# Patient Record
Sex: FEMALE | Race: OTHER | HISPANIC OR LATINO | Employment: UNEMPLOYED | URBAN - METROPOLITAN AREA
[De-identification: names, ages, dates, MRNs, and addresses within clinical notes are randomized per-mention and may not be internally consistent; named-entity substitution may affect disease eponyms.]

---

## 2017-09-21 ENCOUNTER — HOSPITAL ENCOUNTER (EMERGENCY)
Facility: HOSPITAL | Age: 22
Discharge: HOME | End: 2017-09-21
Payer: MEDICAID

## 2017-09-21 VITALS
DIASTOLIC BLOOD PRESSURE: 89 MMHG | HEART RATE: 90 BPM | SYSTOLIC BLOOD PRESSURE: 137 MMHG | TEMPERATURE: 98 F | RESPIRATION RATE: 16 BRPM | WEIGHT: 147 LBS | OXYGEN SATURATION: 100 %

## 2017-09-21 DIAGNOSIS — Z32.02 NEGATIVE PREGNANCY TEST: ICD-10-CM

## 2017-09-21 DIAGNOSIS — R11.2 NAUSEA AND VOMITING IN ADULT: ICD-10-CM

## 2017-09-21 DIAGNOSIS — N39.0 URINARY TRACT INFECTION WITH HEMATURIA, SITE UNSPECIFIED: Primary | ICD-10-CM

## 2017-09-21 DIAGNOSIS — R31.9 URINARY TRACT INFECTION WITH HEMATURIA, SITE UNSPECIFIED: Primary | ICD-10-CM

## 2017-09-21 LAB
B-HCG UR QL: NEGATIVE
BACTERIA #/AREA URNS HPF: ABNORMAL /HPF
BILIRUB UR QL STRIP: ABNORMAL
CLARITY UR: CLEAR
COLOR UR: YELLOW
GLUCOSE UR QL STRIP: NEGATIVE
HGB UR QL STRIP: ABNORMAL
KETONES UR QL STRIP: ABNORMAL
LEUKOCYTE ESTERASE UR QL STRIP: ABNORMAL
MICROSCOPIC COMMENT: ABNORMAL
NITRITE UR QL STRIP: NEGATIVE
PH UR STRIP: 6 [PH] (ref 5–8)
PROT UR QL STRIP: ABNORMAL
RBC #/AREA URNS HPF: 14 /HPF (ref 0–4)
SP GR UR STRIP: >=1.03 (ref 1–1.03)
SQUAMOUS #/AREA URNS HPF: 30 /HPF
URN SPEC COLLECT METH UR: ABNORMAL
UROBILINOGEN UR STRIP-ACNC: NEGATIVE EU/DL
WBC #/AREA URNS HPF: 25 /HPF (ref 0–5)

## 2017-09-21 PROCEDURE — 25000003 PHARM REV CODE 250: Performed by: PHYSICIAN ASSISTANT

## 2017-09-21 PROCEDURE — 99283 EMERGENCY DEPT VISIT LOW MDM: CPT

## 2017-09-21 PROCEDURE — 81000 URINALYSIS NONAUTO W/SCOPE: CPT

## 2017-09-21 PROCEDURE — 81025 URINE PREGNANCY TEST: CPT

## 2017-09-21 RX ORDER — ONDANSETRON 4 MG/1
4 TABLET, ORALLY DISINTEGRATING ORAL
Status: COMPLETED | OUTPATIENT
Start: 2017-09-21 | End: 2017-09-21

## 2017-09-21 RX ORDER — SULFAMETHOXAZOLE AND TRIMETHOPRIM 800; 160 MG/1; MG/1
1 TABLET ORAL 2 TIMES DAILY
Qty: 10 TABLET | Refills: 0 | Status: SHIPPED | OUTPATIENT
Start: 2017-09-21 | End: 2017-09-26

## 2017-09-21 RX ORDER — ONDANSETRON 4 MG/1
4 TABLET, ORALLY DISINTEGRATING ORAL EVERY 8 HOURS PRN
Qty: 10 TABLET | Refills: 0 | Status: SHIPPED | OUTPATIENT
Start: 2017-09-21 | End: 2018-03-15

## 2017-09-21 RX ADMIN — ONDANSETRON 4 MG: 4 TABLET, ORALLY DISINTEGRATING ORAL at 02:09

## 2017-09-21 NOTE — ED PROVIDER NOTES
History      Chief Complaint   Patient presents with    Emesis     emesis x 2 days with generalized abdominal pain       Review of patient's allergies indicates:  No Known Allergies     HPI   HPI    9/21/2017, 1:43 PM   History obtained from the patient      History of Present Illness: Kaitlynn Mccloud is a 22 y.o. female patient who presents to the Emergency Department for nausea and vomiting x 2 days.  Reports vomiting 1-2 times the past two days.  Denies nasal congestion, sore throat, otalgia, headache.  Patient is tolerating water and liquids.  Patient reports feeling mildly nauseated currently.  Patient also complains of suprapubic tenderness.       Arrival mode: Personal vehicle    PCP: Jabier Miles MD       Past Medical History:  Past Medical History:   Diagnosis Date    Hypertension        Past Surgical History:  History reviewed. No pertinent surgical history.      Family History:  History reviewed. No pertinent family history.    Social History:  Social History     Social History Main Topics    Smoking status: Never Smoker    Smokeless tobacco: Never Used    Alcohol use No    Drug use: No    Sexual activity: Not on file       ROS   Review of Systems   Constitutional: Positive for fever (resolved. TMax 100.5). Negative for chills.   HENT: Negative for congestion, ear pain and sore throat.    Respiratory: Positive for cough. Negative for wheezing.    Gastrointestinal: Positive for nausea and vomiting. Negative for diarrhea.       Physical Exam      Initial Vitals [09/21/17 1307]   BP Pulse Resp Temp SpO2   137/89 90 16 98 °F (36.7 °C) 100 %      MAP       105          Physical Exam  Nursing Notes and Vital Signs Reviewed.  Constitutional: Patient is in no apparent distress. Awake and alert. Well-developed and well-nourished.  Head: Atraumatic. Normocephalic.  Eyes: PERRL. EOM intact. Conjunctivae are not pale. No scleral icterus.  ENT: Mucous membranes are moist. Oropharynx is clear and symmetric.     Neck: Supple. Full ROM. No lymphadenopathy.  Cardiovascular: Regular rate. Regular rhythm. No murmurs, rubs, or gallops. Distal pulses are 2+ and symmetric.  Pulmonary/Chest: No respiratory distress. Clear to auscultation bilaterally. No wheezing, rales, or rhonchi.  Abdominal: Soft and non-distended.  There is suprapubic tenderness.  No rebound, guarding, or rigidity. Good bowel sounds.  Genitourinary: No CVA tenderness  Musculoskeletal: Moves all extremities. No obvious deformities. No edema. No calf tenderness.  Skin: Warm and dry.  Neurological:  Alert, awake, and appropriate.  Normal speech.  No acute focal neurological deficits are appreciated.  Psychiatric: Normal affect. Good eye contact. Appropriate in content.    ED Course    Procedures  ED Vital Signs:  Vitals:    09/21/17 1307   BP: 137/89   Pulse: 90   Resp: 16   Temp: 98 °F (36.7 °C)   TempSrc: Oral   SpO2: 100%   Weight: 66.7 kg (147 lb)       Abnormal Lab Results:  Labs Reviewed   URINALYSIS - Abnormal; Notable for the following:        Result Value    Specific Gravity, UA >=1.030 (*)     Protein, UA Trace (*)     Ketones, UA 1+ (*)     Bilirubin (UA) 1+ (*)     Occult Blood UA 2+ (*)     Leukocytes, UA 1+ (*)     All other components within normal limits   URINALYSIS MICROSCOPIC - Abnormal; Notable for the following:     RBC, UA 14 (*)     WBC, UA 25 (*)     Bacteria, UA Few (*)     All other components within normal limits   PREGNANCY TEST, URINE RAPID        All Lab Results:  Results for orders placed or performed during the hospital encounter of 09/21/17   Rapid Pregnancy, Urine   Result Value Ref Range    Preg Test, Ur Negative    Urinalysis Clean Catch   Result Value Ref Range    Specimen UA Urine, Clean Catch     Color, UA Yellow Yellow, Straw, Holly    Appearance, UA Clear Clear    pH, UA 6.0 5.0 - 8.0    Specific Gravity, UA >=1.030 (A) 1.005 - 1.030    Protein, UA Trace (A) Negative    Glucose, UA Negative Negative    Ketones, UA 1+ (A)  Negative    Bilirubin (UA) 1+ (A) Negative    Occult Blood UA 2+ (A) Negative    Nitrite, UA Negative Negative    Urobilinogen, UA Negative <2.0 EU/dL    Leukocytes, UA 1+ (A) Negative   Urinalysis Microscopic   Result Value Ref Range    RBC, UA 14 (H) 0 - 4 /hpf    WBC, UA 25 (H) 0 - 5 /hpf    Bacteria, UA Few (A) None-Occ /hpf    Squam Epithel, UA 30 /hpf    Microscopic Comment SEE COMMENT          Imaging Results:  Imaging Results    None                 The Emergency Provider reviewed the vital signs and test results, which are outlined above.    ED Discussion     9:41 PM: Reassessed pt at this time.  Pt states her condition has improved at this time. Discussed with pt all pertinent ED information and results. Discussed pt dx and plan of tx. Gave pt all f/u and return to the ED instructions. All questions and concerns were addressed at this time. Pt expresses understanding of information and instructions, and is comfortable with plan to discharge. Pt is stable for discharge.    I discussed with patient and/or family/caretaker that evaluation in the ED does not suggest any emergent or life threatening medical conditions requiring immediate intervention beyond what was provided in the ED, and I believe patient is safe for discharge.  Regardless, an unremarkable evaluation in the ED does not preclude the development or presence of a serious of life threatening condition. As such, patient was instructed to return immediately for any worsening or change in current symptoms.      ED Medication(s):  Medications   ondansetron disintegrating tablet 4 mg (4 mg Oral Given 9/21/17 1410)       Discharge Medication List as of 9/21/2017  2:48 PM      START taking these medications    Details   ondansetron (ZOFRAN-ODT) 4 MG TbDL Take 1 tablet (4 mg total) by mouth every 8 (eight) hours as needed (nausea)., Starting Thu 9/21/2017, Print      sulfamethoxazole-trimethoprim 800-160mg (BACTRIM DS) 800-160 mg Tab Take 1 tablet by  mouth 2 (two) times daily., Starting Thu 9/21/2017, Until Tue 9/26/2017, Print             Follow-up Information     Jabier Miles MD In 3 days.    Specialty:  Family Medicine  Contact information:  [address] [facility]  [place] [address]  [phone]                     Medical Decision Making                  Clinical Impression       ICD-10-CM ICD-9-CM   1. Urinary tract infection with hematuria, site unspecified N39.0 599.0    R31.9    2. Nausea and vomiting in adult R11.2 787.01   3. Negative pregnancy test Z32.02 V72.41       Disposition:   Disposition: Discharged  Condition: Stable           Dede Judge PA-C  09/21/17 2145

## 2017-09-21 NOTE — ED NOTES
Patient complains of emesis and all over abd pain. Symptoms have been present for 2 days.   Level of Consciousness: The patient is awake, alert, and oriented.  Appearance: Sitting up in treatment area with no acute distress noted. Clothing and hygiene are clean and worn appropriately.  Skin: Skin is intact; color consistent with ethnicity.    Musculoskeletal: Moves all extremities well in full range of motion. No obvious deformities or swelling noted.  Respiratory: Airway open and patent, respirations spontaneous, even and unlabored. No accessory muscles in use.   Cardiac: Regular rate, no peripheral edema noted..  Abdomen:  No distention noted.  Neurologic: PERRLA, face exhibits symmetrical expression, reports normal sensation to all extremities and face.    Patient verbalized understanding of status and plan of care.

## 2018-03-15 ENCOUNTER — HOSPITAL ENCOUNTER (EMERGENCY)
Facility: HOSPITAL | Age: 23
Discharge: HOME | End: 2018-03-15
Attending: EMERGENCY MEDICINE
Payer: MEDICAID

## 2018-03-15 VITALS
RESPIRATION RATE: 18 BRPM | DIASTOLIC BLOOD PRESSURE: 64 MMHG | OXYGEN SATURATION: 100 % | HEART RATE: 58 BPM | TEMPERATURE: 99 F | SYSTOLIC BLOOD PRESSURE: 118 MMHG

## 2018-03-15 DIAGNOSIS — R11.2 NAUSEA AND VOMITING IN ADULT: Primary | ICD-10-CM

## 2018-03-15 DIAGNOSIS — Z34.90 PREGNANCY, UNSPECIFIED GESTATIONAL AGE: ICD-10-CM

## 2018-03-15 LAB — B-HCG UR QL: POSITIVE

## 2018-03-15 PROCEDURE — 81025 URINE PREGNANCY TEST: CPT

## 2018-03-15 PROCEDURE — 99283 EMERGENCY DEPT VISIT LOW MDM: CPT | Mod: 25

## 2018-03-15 RX ORDER — METOCLOPRAMIDE 10 MG/1
10 TABLET ORAL EVERY 6 HOURS PRN
Qty: 20 TABLET | Refills: 0 | Status: SHIPPED | OUTPATIENT
Start: 2018-03-15 | End: 2022-02-08

## 2018-03-15 RX ORDER — ONDANSETRON 8 MG/1
8 TABLET, ORALLY DISINTEGRATING ORAL
Status: DISCONTINUED | OUTPATIENT
Start: 2018-03-15 | End: 2018-03-15 | Stop reason: HOSPADM

## 2018-03-15 NOTE — ED PROVIDER NOTES
Encounter Date: 3/15/2018       History     Chief Complaint   Patient presents with    Emesis     vomiting in the am x 3 days     22 year old female with complaint of nausea and vomiting X 3 days.  Reports vomiting only in the morning.  No diarrhea.  Also reports mild epigastric pain. No fever or chills.  Tolerating food and fluids without difficulty.  No back pain.            Review of patient's allergies indicates:  No Known Allergies  Past Medical History:   Diagnosis Date    Hypertension      History reviewed. No pertinent surgical history.  History reviewed. No pertinent family history.  Social History   Substance Use Topics    Smoking status: Never Smoker    Smokeless tobacco: Never Used    Alcohol use No     Review of Systems   Constitutional: Negative for fever.   HENT: Negative for sore throat.    Respiratory: Negative for shortness of breath.    Cardiovascular: Negative for chest pain.   Gastrointestinal: Positive for nausea and vomiting.   Genitourinary: Negative for dysuria.   Musculoskeletal: Negative for back pain.   Skin: Negative for rash.   Neurological: Negative for weakness.   Hematological: Does not bruise/bleed easily.       Physical Exam     Initial Vitals [03/15/18 0929]   BP Pulse Resp Temp SpO2   118/64 (!) 58 18 98.7 °F (37.1 °C) 100 %      MAP       82         Physical Exam    Nursing note and vitals reviewed.  Constitutional: She appears well-developed and well-nourished.   HENT:   Head: Normocephalic and atraumatic.   Eyes: Conjunctivae and EOM are normal. Pupils are equal, round, and reactive to light.   Neck: Normal range of motion. Neck supple.   Cardiovascular: Normal rate, regular rhythm, normal heart sounds and intact distal pulses.   Pulmonary/Chest: Breath sounds normal.   Abdominal: Soft. There is no tenderness. There is no rebound and no guarding.   Musculoskeletal: Normal range of motion.   Neurological: She is alert and oriented to person, place, and time. She has normal  strength and normal reflexes.   Skin: Skin is warm and dry.   Psychiatric: She has a normal mood and affect. Her behavior is normal. Thought content normal.         ED Course   Procedures  Labs Reviewed   PREGNANCY TEST, URINE RAPID           10:11 AM  NO vaginal bleeding, no pelvic pain, no abdominal pain at present, pt will follow up with OB for prenatal care    Labs Reviewed   PREGNANCY TEST, URINE RAPID                            Clinical Impression:   The primary encounter diagnosis was Nausea and vomiting in adult. A diagnosis of Pregnancy, unspecified gestational age was also pertinent to this visit.                           Jaime Real NP  03/15/18 101

## 2020-06-09 ENCOUNTER — HOSPITAL ENCOUNTER (EMERGENCY)
Facility: HOSPITAL | Age: 25
Discharge: HOME | End: 2020-06-09
Attending: EMERGENCY MEDICINE
Payer: MEDICAID

## 2020-06-09 VITALS
RESPIRATION RATE: 18 BRPM | HEIGHT: 62 IN | SYSTOLIC BLOOD PRESSURE: 126 MMHG | OXYGEN SATURATION: 99 % | HEART RATE: 74 BPM | TEMPERATURE: 98 F | BODY MASS INDEX: 31.03 KG/M2 | WEIGHT: 168.63 LBS | DIASTOLIC BLOOD PRESSURE: 64 MMHG

## 2020-06-09 DIAGNOSIS — S81.812A LACERATION OF LEFT LOWER EXTREMITY, INITIAL ENCOUNTER: Primary | ICD-10-CM

## 2020-06-09 PROCEDURE — 99282 EMERGENCY DEPT VISIT SF MDM: CPT | Mod: 25 | Performed by: REGISTERED NURSE

## 2020-06-09 PROCEDURE — 12002 RPR S/N/AX/GEN/TRNK2.6-7.5CM: CPT | Performed by: REGISTERED NURSE

## 2020-06-09 PROCEDURE — 25000003 PHARM REV CODE 250: Performed by: REGISTERED NURSE

## 2020-06-09 RX ORDER — LIDOCAINE HYDROCHLORIDE 10 MG/ML
10 INJECTION, SOLUTION EPIDURAL; INFILTRATION; INTRACAUDAL; PERINEURAL
Status: COMPLETED | OUTPATIENT
Start: 2020-06-09 | End: 2020-06-09

## 2020-06-09 RX ADMIN — LIDOCAINE HYDROCHLORIDE 100 MG: 10 INJECTION, SOLUTION EPIDURAL; INFILTRATION; INTRACAUDAL; PERINEURAL at 02:06

## 2020-06-09 NOTE — ED PROVIDER NOTES
"Encounter Date: 6/9/2020       History     Chief Complaint   Patient presents with    Laceration     Pt states, "I dropped a glass bottle and it popped up and cut my left leg."     The history is provided by the patient.   Laceration    The incident occurred just prior to arrival. The laceration is located on the left leg. The laceration is 3 cm in size. The laceration mechanism was a broken glass. The pain has been constant since onset. She reports no foreign bodies present. Her tetanus status is out of date.     Review of patient's allergies indicates:  No Known Allergies  Past Medical History:   Diagnosis Date    Hypertension      No past surgical history on file.  No family history on file.  Social History     Tobacco Use    Smoking status: Never Smoker    Smokeless tobacco: Never Used   Substance Use Topics    Alcohol use: No    Drug use: No     Review of Systems   Constitutional: Negative for fever.   HENT: Negative for sore throat.    Respiratory: Negative for shortness of breath.    Cardiovascular: Negative for chest pain.   Gastrointestinal: Negative for nausea.   Genitourinary: Negative for dysuria.   Musculoskeletal: Negative for back pain.        + Laceration to LLE   Skin: Negative for rash.   Neurological: Negative for weakness.   Hematological: Does not bruise/bleed easily.   All other systems reviewed and are negative.      Physical Exam     Initial Vitals [06/09/20 1348]   BP Pulse Resp Temp SpO2   137/67 77 20 98.1 °F (36.7 °C) 99 %      MAP       --         Physical Exam    Constitutional: She appears well-developed and well-nourished. She is not diaphoretic. No distress.   HENT:   Head: Normocephalic and atraumatic.   Eyes: Conjunctivae and EOM are normal. Pupils are equal, round, and reactive to light.   Neck: Normal range of motion. Neck supple.   Cardiovascular: Normal rate, regular rhythm and normal heart sounds.   No murmur heard.  Pulmonary/Chest: Breath sounds normal. No respiratory " distress. She has no wheezes. She has no rales.   Abdominal: Soft. Bowel sounds are normal. There is no tenderness. There is no rebound and no guarding.   Musculoskeletal: Normal range of motion. She exhibits no edema or tenderness.        Left lower leg: She exhibits laceration.        Legs:  3 cm laceration to lateral L lower leg   Neurological: She is alert and oriented to person, place, and time. No cranial nerve deficit. GCS score is 15. GCS eye subscore is 4. GCS verbal subscore is 5. GCS motor subscore is 6.   Skin: Skin is warm and dry. Capillary refill takes less than 2 seconds.   Psychiatric: She has a normal mood and affect. Thought content normal.         ED Course   Lac Repair  Date/Time: 6/9/2020 2:55 PM  Performed by: CHADWICK Stone Jr.  Authorized by: CHADWICK Stone Jr.   Consent Done: Yes  Consent given by: patient  Body area: lower extremity  Location details: left lower leg  Laceration length: 3 cm  Foreign bodies: no foreign bodies  Anesthesia: local infiltration    Anesthesia:  Local Anesthetic: lidocaine 1% without epinephrine  Anesthetic total: 3 mL  Irrigation solution: saline  Amount of cleaning: standard  Skin closure: staples  Number of sutures: 3  Dressing: dressing applied  Patient tolerance: Patient tolerated the procedure well with no immediate complications        Labs Reviewed - No data to display       Imaging Results    None            I discussed wound care precautions with patient and/or family/caretaker; specifically that all wounds have risk of infection despite efforts to cleanse and debride the wound; and there is a risk of an occult foreign body (and thus increased risk of infection) despite a negative examination.  I discussed with patient need to return for any signs of infection, specifically redness, increased pain, fever, drainage of pus, or any concern, immediately.                                 Clinical Impression:       ICD-10-CM ICD-9-CM   1.  Laceration of left lower extremity, initial encounter S81.812A 894.0                                Ben Llanos Jr., FNP  06/09/20 1457

## 2021-02-03 ENCOUNTER — HOSPITAL ENCOUNTER (EMERGENCY)
Facility: HOSPITAL | Age: 26
Discharge: HOME | End: 2021-02-03
Attending: EMERGENCY MEDICINE
Payer: MEDICAID

## 2021-02-03 VITALS
TEMPERATURE: 100 F | BODY MASS INDEX: 30.24 KG/M2 | WEIGHT: 165.38 LBS | DIASTOLIC BLOOD PRESSURE: 64 MMHG | RESPIRATION RATE: 19 BRPM | OXYGEN SATURATION: 99 % | SYSTOLIC BLOOD PRESSURE: 114 MMHG | HEART RATE: 105 BPM

## 2021-02-03 DIAGNOSIS — J02.9 SORE THROAT: ICD-10-CM

## 2021-02-03 DIAGNOSIS — J02.9 PHARYNGITIS, UNSPECIFIED ETIOLOGY: Primary | ICD-10-CM

## 2021-02-03 DIAGNOSIS — R50.9 FEVER, UNSPECIFIED FEVER CAUSE: ICD-10-CM

## 2021-02-03 LAB
CTP QC/QA: YES
GROUP A STREP, MOLECULAR: NEGATIVE
HCV AB SERPL QL IA: NEGATIVE
HIV 1+2 AB+HIV1 P24 AG SERPL QL IA: NEGATIVE
SARS-COV-2 RDRP RESP QL NAA+PROBE: NEGATIVE

## 2021-02-03 PROCEDURE — 99284 EMERGENCY DEPT VISIT MOD MDM: CPT | Mod: 25

## 2021-02-03 PROCEDURE — U0002 COVID-19 LAB TEST NON-CDC: HCPCS | Performed by: NURSE PRACTITIONER

## 2021-02-03 PROCEDURE — 86703 HIV-1/HIV-2 1 RESULT ANTBDY: CPT

## 2021-02-03 PROCEDURE — 36415 COLL VENOUS BLD VENIPUNCTURE: CPT

## 2021-02-03 PROCEDURE — 87651 STREP A DNA AMP PROBE: CPT

## 2021-02-03 PROCEDURE — 86803 HEPATITIS C AB TEST: CPT

## 2021-02-03 RX ORDER — AMOXICILLIN AND CLAVULANATE POTASSIUM 875; 125 MG/1; MG/1
1 TABLET, FILM COATED ORAL 2 TIMES DAILY
Qty: 14 TABLET | Refills: 0 | Status: SHIPPED | OUTPATIENT
Start: 2021-02-03 | End: 2022-02-08

## 2021-02-03 RX ORDER — IBUPROFEN 600 MG/1
600 TABLET ORAL EVERY 6 HOURS PRN
Qty: 20 TABLET | Refills: 0 | Status: SHIPPED | OUTPATIENT
Start: 2021-02-03 | End: 2022-02-08

## 2021-02-03 RX ORDER — DEXAMETHASONE 4 MG/1
4 TABLET ORAL DAILY
Qty: 5 TABLET | Refills: 0 | Status: SHIPPED | OUTPATIENT
Start: 2021-02-03 | End: 2021-02-08

## 2022-02-08 PROBLEM — Z87.42 HISTORY OF PCOS: Status: ACTIVE | Noted: 2022-02-08
